# Patient Record
Sex: FEMALE | Race: WHITE
[De-identification: names, ages, dates, MRNs, and addresses within clinical notes are randomized per-mention and may not be internally consistent; named-entity substitution may affect disease eponyms.]

---

## 2017-08-28 ENCOUNTER — HOSPITAL ENCOUNTER (OUTPATIENT)
Dept: HOSPITAL 45 - C.MAMM | Age: 72
Discharge: HOME | End: 2017-08-28
Attending: FAMILY MEDICINE
Payer: COMMERCIAL

## 2017-08-28 DIAGNOSIS — Z12.31: Primary | ICD-10-CM

## 2017-08-28 NOTE — MAMMOGRAPHY REPORT
BILATERAL DIGITAL SCREENING MAMMOGRAM WITH CAD: 8/28/2017

CLINICAL HISTORY: Routine screening.  Patient has no complaints.  





TECHNIQUE: Bilateral CC and MLO views were obtained, with additional attempts at both CC and left MLO
 views to include more posterior tissue.  Current study was also evaluated with a Computer Aided Dete
ction (CAD) system.  



COMPARISON: Comparison is made to exams dated:  7/25/2016 mammogram, 7/22/2015 mammogram, 7/21/2014 m
ammogram, 7/18/2013 mammogram, 7/16/2012 mammogram, and 7/12/2010 mammogram - [facility]
enter.   



BREAST COMPOSITION:  There are scattered areas of fibroglandular density in both breasts.  



FINDINGS: The exam is suboptimal due to inability of the patient adequately position for the exam, pa
rticularly of the left MLO view, despite assistance from a second mammography technologist.  There ar
e a few stable benign-appearing calcifications in the breasts.  No obvious new mass, architectural di
stortion or cluster of suspicious microcalcifications is seen.  



IMPRESSION:  ACR BI-RADS CATEGORY 1: NEGATIVE

There is no mammographic evidence of malignancy, within the limitations of the exam. A 1 year screeni
ng mammogram is recommended.  The patient will receive written notification of the results.  





Approximately 10% of breast cancers are not detected with mammography. A negative mammographic report
 should not delay biopsy if a clinically suggestive mass is present.



Rosa Bermudez M.D.          

ay/:8/28/2017 15:18:29  



Imaging Technologist: Larissa Palumbo RT(R)(M), [facility]

letter sent: Normal 1/2  

BI-RADS Code: ACR BI-RADS Category 1: Negative

## 2018-02-11 ENCOUNTER — HOSPITAL ENCOUNTER (INPATIENT)
Dept: HOSPITAL 45 - C.EDB | Age: 73
LOS: 5 days | Discharge: NURSING, REHAB OR LONG-TERM CARE | DRG: 54 | End: 2018-02-16
Attending: HOSPITALIST | Admitting: FAMILY MEDICINE
Payer: COMMERCIAL

## 2018-02-11 VITALS
BODY MASS INDEX: 24.6 KG/M2 | HEIGHT: 61 IN | BODY MASS INDEX: 24.6 KG/M2 | BODY MASS INDEX: 24.6 KG/M2 | BODY MASS INDEX: 24.6 KG/M2 | HEIGHT: 61 IN | BODY MASS INDEX: 24.6 KG/M2 | WEIGHT: 130.29 LBS | BODY MASS INDEX: 24.6 KG/M2 | BODY MASS INDEX: 24.6 KG/M2 | WEIGHT: 130.29 LBS

## 2018-02-11 VITALS
TEMPERATURE: 98.42 F | DIASTOLIC BLOOD PRESSURE: 91 MMHG | SYSTOLIC BLOOD PRESSURE: 152 MMHG | HEART RATE: 89 BPM | OXYGEN SATURATION: 95 %

## 2018-02-11 DIAGNOSIS — G93.49: ICD-10-CM

## 2018-02-11 DIAGNOSIS — E87.6: ICD-10-CM

## 2018-02-11 DIAGNOSIS — M25.562: ICD-10-CM

## 2018-02-11 DIAGNOSIS — R29.6: ICD-10-CM

## 2018-02-11 DIAGNOSIS — K59.00: ICD-10-CM

## 2018-02-11 DIAGNOSIS — C71.1: Primary | ICD-10-CM

## 2018-02-11 DIAGNOSIS — E78.5: ICD-10-CM

## 2018-02-11 DIAGNOSIS — Z86.73: ICD-10-CM

## 2018-02-11 LAB
BASOPHILS # BLD: 0 K/UL (ref 0–0.2)
BASOPHILS NFR BLD: 0 %
BUN SERPL-MCNC: 15 MG/DL (ref 7–18)
CALCIUM SERPL-MCNC: 9.4 MG/DL (ref 8.5–10.1)
CK MB SERPL-MCNC: 0.6 NG/ML (ref 0.5–3.6)
CO2 SERPL-SCNC: 32 MMOL/L (ref 21–32)
CREAT SERPL-MCNC: 0.78 MG/DL (ref 0.6–1.2)
EOS ABS #: 0.01 K/UL (ref 0–0.5)
EOSINOPHIL NFR BLD AUTO: 145 K/UL (ref 130–400)
GLUCOSE SERPL-MCNC: 138 MG/DL (ref 70–99)
HCT VFR BLD CALC: 37.5 % (ref 37–47)
HGB BLD-MCNC: 12.8 G/DL (ref 12–16)
IG#: 0.01 K/UL (ref 0–0.02)
IMM GRANULOCYTES NFR BLD AUTO: 11.3 %
INR PPP: 0.9 (ref 0.9–1.1)
LYMPHOCYTES # BLD: 0.48 K/UL (ref 1.2–3.4)
MCH RBC QN AUTO: 34.1 PG (ref 25–34)
MCHC RBC AUTO-ENTMCNC: 34.1 G/DL (ref 32–36)
MCV RBC AUTO: 100 FL (ref 80–100)
MONO ABS #: 0.39 K/UL (ref 0.11–0.59)
MONOCYTES NFR BLD: 9.2 %
NEUT ABS #: 3.34 K/UL (ref 1.4–6.5)
NEUTROPHILS # BLD AUTO: 0.2 %
NEUTROPHILS NFR BLD AUTO: 79.1 %
PMV BLD AUTO: 8.7 FL (ref 7.4–10.4)
POTASSIUM SERPL-SCNC: 4 MMOL/L (ref 3.5–5.1)
PTT PATIENT: 23.2 SECONDS (ref 21–31)
RED CELL DISTRIBUTION WIDTH CV: 12.9 % (ref 11.5–14.5)
RED CELL DISTRIBUTION WIDTH SD: 46.9 FL (ref 36.4–46.3)
SODIUM SERPL-SCNC: 142 MMOL/L (ref 136–145)
WBC # BLD AUTO: 4.23 K/UL (ref 4.8–10.8)

## 2018-02-11 RX ADMIN — LATANOPROST SCH DROPS: 50 SOLUTION/ DROPS OPHTHALMIC at 23:09

## 2018-02-11 RX ADMIN — LEVETIRACETAM SCH MLS/HR: 500 INJECTION, SOLUTION, CONCENTRATE INTRAVENOUS at 23:08

## 2018-02-11 NOTE — DIAGNOSTIC IMAGING REPORT
HEAD WITHOUT CONTRAST (CT)



CT DOSE: 724.83 mGy.cm



HISTORY: Mental status change  Pt c/o left leg weakness



TECHNIQUE: Multiaxial CT images of the head were performed without the use of

intravenous contrast.  A dose lowering technique was utilized adhering to the

principles of ALARA.





Comparison: 10/9/2016



Findings: Major sinuses are considered clear. Interval frontal craniotomy.

Craniotomy flap is aligned anatomically.



Mild ventricular prominence. Midline shift and effacement of the right lateral

ventricle is not present currently.



There are findings of progressive frontal atrophy as well as encephalomalacia.

Appears to be a degree of residual vasogenic edema involving the anterior right

frontal as well as anterior left frontal lobes. A component of high density

immediately anterior to the right lateral ventricle is present which was

identified on the prior study. This presumably represents calcification.



No evidence for acute intracranial hemorrhage. The calvarium and skull base are

intact. The ventricles and sulci are within normal limits. There is no mass,

hematoma, midline shift, or acute infarct.



Impression:



1. Interval frontal craniotomy with considerable encephalomalacia involving the

anterior right and left frontal lobe regions..

2. Mildly progressive atrophy compared to the prior study.

3. No evidence for acute intracranial hemorrhage or midline shift.

4. Presence of vasogenic edema of the inferior right frontal lobe raises and a

potential concern for residual disease. Correlation with prior imaging studies

from any additional facility is considered mandatory.











The above report was generated using voice recognition software.  It may contain

grammatical, syntax or spelling errors.







Electronically signed by:  Phoenix Gerard M.D.

2/11/2018 5:08 PM



Dictated Date/Time:  2/11/2018 4:57 PM

## 2018-02-11 NOTE — DIAGNOSTIC IMAGING REPORT
L FEMUR 2 VIEWS ROUTINE



CLINICAL HISTORY: Pt c/o left hip pain  trauma. Pain.



COMPARISON: None.



DISCUSSION: The bones and joint spaces appear intact. There is no evidence of

fracture, dislocation or bony disease. Moderate degenerative change of the left

knee and left hip.



IMPRESSION: No acute process.











The above report was generated using voice recognition software.  It may contain

grammatical, syntax or spelling errors.







Electronically signed by:  Phoenix Gerard M.D.

2/11/2018 5:36 PM



Dictated Date/Time:  2/11/2018 5:36 PM

## 2018-02-11 NOTE — DIAGNOSTIC IMAGING REPORT
PELVIS 1 OR 2 VIEW ROUTINE



CLINICAL HISTORY: Pt c/o left hip pain pain



COMPARISON: None.



DISCUSSION: Mild degenerative changes of the hips bilaterally. Diffuse

calcifications soft tissue pelvis suggesting a fibroid-type uterus. No acute

bony abnormality. There is no evidence for soft tissue swelling.



IMPRESSION: No acute bony abnormality. Calcified uterine fibroids











The above report was generated using voice recognition software.  It may contain

grammatical, syntax or spelling errors.







Electronically signed by:  Phoenix Gerard M.D.

2/11/2018 5:42 PM



Dictated Date/Time:  2/11/2018 5:41 PM

## 2018-02-11 NOTE — DIAGNOSTIC IMAGING REPORT
L LOWER EXTREMITY WITHOUT



CT DOSE: 631.97 mGy.cm



HISTORY: Trauma  left femur to mid tibia



TECHNIQUE: Multiaxial CT images of the left thigh and lower leg were performed

and reformatted in the sagittal and coronal plane without the use of contrast. 

A dose lowering technique was utilized adhering to the principles of ALARA.



COMPARISON:  None.



FINDINGS: No fracture or dislocation. Soft tissues are unremarkable.

Degenerative changes of the left knee and to a lesser extent left hip. Small

left knee joint effusion. This presumably is on a degenerative basis.



IMPRESSION:  

No acute bony abnormality.











The above report was generated using voice recognition software.  It may contain

grammatical, syntax or spelling errors.







Electronically signed by:  Phoenix Gerard M.D.

2/11/2018 6:49 PM



Dictated Date/Time:  2/11/2018 6:46 PM

## 2018-02-11 NOTE — DIAGNOSTIC IMAGING REPORT
BRAIN COMBO



CLINICAL HISTORY: Pt brain tumor brain tumor



COMPARISON STUDY:  No previous studies for comparison. 



TECHNIQUE:  Utilizing a 1.5 Alexandra magnet and dedicated coil, multiplanar,

multiecho imaging of the brain was performed pre and postcontrast

administration.  IV administration of 5.5 mL of Gadavist contrast was

uneventful.



FINDINGS: Limited study in terms of diagnostic accuracy as prior studies are not

performed at this institution and are not available. Patient also exhibits

extensive motion making most sequences near nondiagnostic.



Diffusion-weighted images appear to be negative for a major acute ischemic

insult. The may be a punctate right superior parietal ischemic focus this is

seen on image 19 of 50 this may also represent motion artifact.



Postoperative changes consistent with extensive anterior frontal craniotomy

defect. Extensive post procedural encephalomalacia right frontal lobe. Vasogenic

edema of the mid and inferior right frontal lobe with additional regions of

atrophic change involving the left frontal and convexity regions.



Postcontrast images are essentially nondiagnostic. They do not show a major

component of enhancement although small regions of enhancement are potentially

not identified.



There is no midline shift.



IMPRESSION:  

1. Extremely limited study as no prior postoperative scans are available for

comparison.

2. Current study is also severely compromised due to severe patient motion

despite medication.

3. Extensive right frontal postoperative encephalomalacia with evidence for an

anatomically aligned craniotomy flap.

4. Vasogenic edema primarily of the mid to inferior right frontal and to lesser

extent left inferior frontal lobes of uncertain chronicity.

5. Potential small punctate acute ischemic focus right superior parietal lobe

6. No significant postcontrast enhancement although quality of those studies is

severely compromised due to patient motion and are near nondiagnostic











The above report was generated using voice recognition software.  It may contain

grammatical, syntax or spelling errors.







Electronically signed by:  Phoenix Gerard M.D.

2/11/2018 8:21 PM



Dictated Date/Time:  2/11/2018 8:16 PM

## 2018-02-11 NOTE — EMERGENCY ROOM VISIT NOTE
History


Report prepared by Ethan:  Danny Norton


Under the Supervision of:  Dr. Tyrone Diehl M.D.


First contact with patient:  15:46


Chief Complaint:  FALL


Stated Complaint:  FELL OUT OF BED THIS AM L KNEE PAIN, CHEMO CURRENT





History of Present Illness


The patient is a 73 year old female who presents to the Emergency Room with 

complaints of constant, left-sided weakness following her fall out of bed this 

morning. The patient's daughter notes that the patient has fallen several times 

in the past few days. She also notes that the patient has been experiencing 

left sided weakness and has been unable to bear weight on her left side since 

her fall. The daughter notes that the patient has been more "glossy-eyed" than 

usual, is dehydrated, and has not been eating well. The patient has not had 

anything to eat today. After the fall, the patient's daughter iced the patient'

s left hip, gave the patient her prescribed Norco, and propped her up with 

pillows. The patient has brain cancer and her last scheduled dose of 

chemotherapy is tonight.





   Source of History:  family (Daughter)


   Onset:  Following her fall today.


   Position:  other (Left side. )


   Quality:  other (Weakness. )


   Timing:  constant


Note:


Associated Symptoms: Glossy eyes, loss of appetite, Inability to bear weight on 

left side. 


,





Review of Systems


See HPI for pertinent positives & negatives. A total of 10 systems reviewed and 

were otherwise negative.





Past Medical & Surgical


Medical Problems:


(1) HLD (hyperlipidemia)








Family History





No pertinent family history





Social History


Smoking Status:  Unknown if Ever Smoked


Occupation Status:  retired





Current/Historical Medications


Scheduled


Atorvastatin (Lipitor), 20 MG PO HS


Glucosamine-Chondroitin (Osteo Bi-Flex Regular Str), 1 TAB PO DAILY


Latanoprost (Xalatan 0.005% Oph Sol), 1 DROPS OP HS


Levetiracetam (Keppra), 750 MG PO BID


Lorazepam (Ativan), 0.5 MG PO AS DIRECTED


Multivitamin (Multivitamin), 1 TAB PO DAILY


Polyethylene Glycol 3350 (Miralax), 17 GM PO DAILY


Temozolomide (Temodar), 100 MG PO DAILY


Temozolomide (Temodar), 140 MG PO DAILY





Scheduled PRN


Acetaminophen (Tylenol Arthitis Ext Rel), 1,300 MG PO Q8 PRN for Pain


Hydrocodone/Acetaminophen 5MG/325MG (Norco 5MG/325MG), 1 TABLET PO Q6H PRN for 

Pain


Ondansetron Hcl (Zofran), 8 MG PO BID PRN for Nausea





Allergies


Coded Allergies:  


     No Known Allergies (Unverified , 10/9/16)





Physical Exam


Vital Signs











  Date Time  Temp Pulse Resp B/P (MAP) Pulse Ox O2 Delivery O2 Flow Rate FiO2


 


2/11/18 17:51  76 16 113/70 96 Room Air  


 


2/11/18 16:19  75 16 117/82 95 Room Air  


 


2/11/18 16:17  78      


 


2/11/18 14:45 36.7 87 16 111/72 95 Room Air  











Physical Exam


GENERAL: The patient is cachectic in appearance. Female


HEAD: Multiple contusions to face. 


EYES: Ocular movements intact pupils equal and react to light


OROPHARYNX mucous membranes are moist no exudates present no erythema or edema 

present


NECK: Supple no nuchal rigidity


CHEST: Good equal expansion


LUNGS: Clear and equal to auscultation


CARDIAC: Normal S1 and S2


ABDOMEN: Soft nontender no guarding


BACK: No CVA tenderness


EXTREMITIES: Multiple bruises to her left knee and left hip area. 


NEURO: The patient appears confused. She is unable to answer questions 

appropriately.





Medical Decision & Procedures


ER Provider


Diagnostic Interpretation:


Radiology results as stated below per my review and radiologist interpretation:





CHEST ONE VIEW PORTABLE





CLINICAL HISTORY: Pt c/o multiple falls trauma





COMPARISON STUDY:  10/9/2016





FINDINGS: The bones soft tissues and hemidiaphragms are normal. The


cardiomediastinal silhouette is normal. The lungs are clear. The pulmonary


vasculature is normal. 





IMPRESSION:  Negative chest. 

















The above report was generated using voice recognition software.  It may contain


grammatical, syntax or spelling errors.














Electronically signed by:  Phoenix Gerard M.D.


2/11/2018 5:32 PM





Dictated Date/Time:  2/11/2018 5:32 PM








HEAD WITHOUT CONTRAST (CT)





CT DOSE: 724.83 mGy.cm





HISTORY: Mental status change  Pt c/o left leg weakness





TECHNIQUE: Multiaxial CT images of the head were performed without the use of


intravenous contrast.  A dose lowering technique was utilized adhering to the


principles of ALARA.








Comparison: 10/9/2016





Findings: Major sinuses are considered clear. Interval frontal craniotomy.


Craniotomy flap is aligned anatomically.





Mild ventricular prominence. Midline shift and effacement of the right lateral


ventricle is not present currently.





There are findings of progressive frontal atrophy as well as encephalomalacia.


Appears to be a degree of residual vasogenic edema involving the anterior right


frontal as well as anterior left frontal lobes. A component of high density


immediately anterior to the right lateral ventricle is present which was


identified on the prior study. This presumably represents calcification.





No evidence for acute intracranial hemorrhage. The calvarium and skull base are


intact. The ventricles and sulci are within normal limits. There is no mass,


hematoma, midline shift, or acute infarct.





Impression:





1. Interval frontal craniotomy with considerable encephalomalacia involving the


anterior right and left frontal lobe regions..


2. Mildly progressive atrophy compared to the prior study.


3. No evidence for acute intracranial hemorrhage or midline shift.


4. Presence of vasogenic edema of the inferior right frontal lobe raises and a


potential concern for residual disease. Correlation with prior imaging studies


from any additional facility is considered mandatory.

















The above report was generated using voice recognition software.  It may contain


grammatical, syntax or spelling errors.











Electronically signed by:  Phoenix Gerard M.D.


2/11/2018 5:08 PM





Dictated Date/Time:  2/11/2018 4:57 PM





L FEMUR 2 VIEWS ROUTINE





CLINICAL HISTORY: Pt c/o left hip pain  trauma. Pain.





COMPARISON: None.





DISCUSSION: The bones and joint spaces appear intact. There is no evidence of


fracture, dislocation or bony disease. Moderate degenerative change of the left


knee and left hip.





IMPRESSION: No acute process.

















The above report was generated using voice recognition software.  It may contain


grammatical, syntax or spelling errors.











Electronically signed by:  Phoenix Gerard M.D.


2/11/2018 5:36 PM





Dictated Date/Time:  2/11/2018 5:36 PM





PELVIS 1 OR 2 VIEW ROUTINE





CLINICAL HISTORY: Pt c/o left hip pain pain





COMPARISON: None.





DISCUSSION: Mild degenerative changes of the hips bilaterally. Diffuse


calcifications soft tissue pelvis suggesting a fibroid-type uterus. No acute


bony abnormality. There is no evidence for soft tissue swelling.





IMPRESSION: No acute bony abnormality. Calcified uterine fibroids

















The above report was generated using voice recognition software.  It may contain


grammatical, syntax or spelling errors.











Electronically signed by:  Phoenix Gerard M.D.


2/11/2018 5:42 PM





Dictated Date/Time:  2/11/2018 5:41 PM





L TIBIA/FIBULA 2 VIEWS ROUTINE





CLINICAL HISTORY: Pt c/o left hip pain  trauma. Pain.





COMPARISON: None.





DISCUSSION: The bones and joint spaces appear intact. There is no evidence of


fracture, dislocation or bony disease. There is no evidence for soft tissue


swelling.





IMPRESSION: Negative study.

















The above report was generated using voice recognition software.  It may contain


grammatical, syntax or spelling errors.











Electronically signed by:  Phoenix Gerard M.D.


2/11/2018 5:36 PM





Dictated Date/Time:  2/11/2018 5:35 PM





Laboratory Results


2/11/18 16:15








Red Blood Count 3.75, Mean Corpuscular Volume 100.0, Mean Corpuscular 

Hemoglobin 34.1, Mean Corpuscular Hemoglobin Concent 34.1, Mean Platelet Volume 

8.7, Neutrophils (%) (Auto) 79.1, Lymphocytes (%) (Auto) 11.3, Monocytes (%) (

Auto) 9.2, Eosinophils (%) (Auto) 0.2, Basophils (%) (Auto) 0.0, Neutrophils # (

Auto) 3.34, Lymphocytes # (Auto) 0.48, Monocytes # (Auto) 0.39, Eosinophils # (

Auto) 0.01, Basophils # (Auto) 0.00





2/11/18 16:15

















Test


  2/11/18


16:15 2/11/18


16:28


 


White Blood Count


  4.23 K/uL


(4.8-10.8) 


 


 


Red Blood Count


  3.75 M/uL


(4.2-5.4) 


 


 


Hemoglobin


  12.8 g/dL


(12.0-16.0) 


 


 


Hematocrit 37.5 % (37-47)  


 


Mean Corpuscular Volume


  100.0 fL


() 


 


 


Mean Corpuscular Hemoglobin


  34.1 pg


(25-34) 


 


 


Mean Corpuscular Hemoglobin


Concent 34.1 g/dl


(32-36) 


 


 


Platelet Count


  145 K/uL


(130-400) 


 


 


Mean Platelet Volume


  8.7 fL


(7.4-10.4) 


 


 


Neutrophils (%) (Auto) 79.1 %  


 


Lymphocytes (%) (Auto) 11.3 %  


 


Monocytes (%) (Auto) 9.2 %  


 


Eosinophils (%) (Auto) 0.2 %  


 


Basophils (%) (Auto) 0.0 %  


 


Neutrophils # (Auto)


  3.34 K/uL


(1.4-6.5) 


 


 


Lymphocytes # (Auto)


  0.48 K/uL


(1.2-3.4) 


 


 


Monocytes # (Auto)


  0.39 K/uL


(0.11-0.59) 


 


 


Eosinophils # (Auto)


  0.01 K/uL


(0-0.5) 


 


 


Basophils # (Auto)


  0.00 K/uL


(0-0.2) 


 


 


RDW Standard Deviation


  46.9 fL


(36.4-46.3) 


 


 


RDW Coefficient of Variation


  12.9 %


(11.5-14.5) 


 


 


Immature Granulocyte % (Auto) 0.2 %  


 


Immature Granulocyte # (Auto)


  0.01 K/uL


(0.00-0.02) 


 


 


Prothrombin Time


  9.9 SECONDS


(9.0-12.0) 


 


 


Prothromb Time International


Ratio 0.9 (0.9-1.1) 


  


 


 


Activated Partial


Thromboplast Time 23.2 SECONDS


(21.0-31.0) 


 


 


Partial Thromboplastin Ratio 0.9  


 


Anion Gap


  7.0 mmol/L


(3-11) 


 


 


Est Creatinine Clear Calc


Drug Dose 54.2 ml/min 


  


 


 


Estimated GFR (


American) 88.6 


  


 


 


Estimated GFR (Non-


American 76.5 


  


 


 


BUN/Creatinine Ratio 18.8 (10-20)  


 


Calcium Level


  9.4 mg/dl


(8.5-10.1) 


 


 


Total Creatine Kinase


  73 U/L


() 


 


 


Creatine Kinase MB


  0.6 ng/ml


(0.5-3.6) 


 


 


Creatine Kinase MB Ratio 0.8 (0-3.0)  


 


Troponin I


  < 0.015 ng/ml


(0-0.045) 


 


 


Chemistry Specimen Hemolysis   


 


Urine Color  YELLOW 


 


Urine Appearance  CLEAR (CLEAR) 


 


Urine pH  7.0 (4.5-7.5) 


 


Urine Specific Gravity


  


  1.014


(1.000-1.030)


 


Urine Protein  NEG (NEG) 


 


Urine Glucose (UA)  NEG (NEG) 


 


Urine Ketones  NEG (NEG) 


 


Urine Occult Blood  NEG (NEG) 


 


Urine Nitrite  NEG (NEG) 


 


Urine Bilirubin  NEG (NEG) 


 


Urine Urobilinogen  NEG (NEG) 


 


Urine Leukocyte Esterase  NEG (NEG) 





Labs reviewed by ED physician.





ECG


Rate (beats per minute):  82


Rhythm:  normal sinus


Findings:  other (Pre-Mature atrial complex, No ST elevation or depression, 

Normal Axis)


Change:


Patient's electrocardiogram per my interpretation.





ED Course


1547: Past medical records reviewed. The patient was evaluated in room B03. A 

complete history and physical examination was performed. 





1600: Ordered Hydromorphone HCL .25mg IV. 





1816: I discussed the patient's case with Dr. Elbert Rivas. 

He will evaluate the patient for further care and treatment.





Medical Decision





Differential diagnosis:


Etiologies such as metabolic, infection, hypo/hyperglycemia, electrolyte 

abnormalities, cardiac sources, intracerebral event, toxicologic, neurologic, 

as well as others were entertained.





Medication Reconcilliation


Current Medication List:  was personally reviewed by me





Blood Pressure Screening


Patient's blood pressure:  Normal blood pressure





Consults


Time Called:  1802


Consulting Physician:  Dr. Elbert Rivas


Returned Call:  [phone]


I discussed the patient's case with Dr. Elbert Rivas. He will 

evaluate the patient for further care and treatment.





Impression





 Primary Impression:  


 Multiple falls


 Additional Impressions:  


 Left leg pain


 Confusion





Scribe Attestation


The scribe's documentation has been prepared under my direction and personally 

reviewed by me in its entirety. I confirm that the note above accurately 

reflects all work, treatment, procedures, and medical decision making performed 

by me.





Departure Information


Dispostion


Being Evaluated By Hospitalist





Referrals


Shelley Jackson D.O. (PCP)





Patient Instructions


My [facility]





Problem Qualifiers
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
## 2018-02-11 NOTE — DIAGNOSTIC IMAGING REPORT
L TIBIA/FIBULA 2 VIEWS ROUTINE



CLINICAL HISTORY: Pt c/o left hip pain  trauma. Pain.



COMPARISON: None.



DISCUSSION: The bones and joint spaces appear intact. There is no evidence of

fracture, dislocation or bony disease. There is no evidence for soft tissue

swelling.



IMPRESSION: Negative study.











The above report was generated using voice recognition software.  It may contain

grammatical, syntax or spelling errors.







Electronically signed by:  Phoenix Gerard M.D.

2/11/2018 5:36 PM



Dictated Date/Time:  2/11/2018 5:35 PM

## 2018-02-11 NOTE — DIAGNOSTIC IMAGING REPORT
CHEST ONE VIEW PORTABLE



CLINICAL HISTORY: Pt c/o multiple falls trauma



COMPARISON STUDY:  10/9/2016



FINDINGS: The bones soft tissues and hemidiaphragms are normal. The

cardiomediastinal silhouette is normal. The lungs are clear. The pulmonary

vasculature is normal. 



IMPRESSION:  Negative chest. 











The above report was generated using voice recognition software.  It may contain

grammatical, syntax or spelling errors.









Electronically signed by:  Phoenix Gerard M.D.

2/11/2018 5:32 PM



Dictated Date/Time:  2/11/2018 5:32 PM

## 2018-02-12 VITALS
HEART RATE: 82 BPM | OXYGEN SATURATION: 96 % | SYSTOLIC BLOOD PRESSURE: 109 MMHG | TEMPERATURE: 98.06 F | DIASTOLIC BLOOD PRESSURE: 76 MMHG

## 2018-02-12 VITALS
OXYGEN SATURATION: 96 % | DIASTOLIC BLOOD PRESSURE: 74 MMHG | TEMPERATURE: 97.34 F | SYSTOLIC BLOOD PRESSURE: 136 MMHG | HEART RATE: 68 BPM

## 2018-02-12 VITALS
SYSTOLIC BLOOD PRESSURE: 114 MMHG | TEMPERATURE: 98.6 F | HEART RATE: 87 BPM | OXYGEN SATURATION: 95 % | DIASTOLIC BLOOD PRESSURE: 76 MMHG

## 2018-02-12 VITALS — OXYGEN SATURATION: 96 %

## 2018-02-12 VITALS
OXYGEN SATURATION: 94 % | TEMPERATURE: 98.24 F | DIASTOLIC BLOOD PRESSURE: 71 MMHG | HEART RATE: 73 BPM | SYSTOLIC BLOOD PRESSURE: 108 MMHG

## 2018-02-12 VITALS
DIASTOLIC BLOOD PRESSURE: 81 MMHG | SYSTOLIC BLOOD PRESSURE: 121 MMHG | OXYGEN SATURATION: 97 % | TEMPERATURE: 97.34 F | HEART RATE: 77 BPM

## 2018-02-12 VITALS
DIASTOLIC BLOOD PRESSURE: 67 MMHG | HEART RATE: 69 BPM | OXYGEN SATURATION: 94 % | SYSTOLIC BLOOD PRESSURE: 102 MMHG | TEMPERATURE: 98.6 F

## 2018-02-12 RX ADMIN — LATANOPROST SCH DROPS: 50 SOLUTION/ DROPS OPHTHALMIC at 22:12

## 2018-02-12 RX ADMIN — LEVETIRACETAM SCH MLS/HR: 500 INJECTION, SOLUTION, CONCENTRATE INTRAVENOUS at 20:40

## 2018-02-12 RX ADMIN — ACETAMINOPHEN SCH MG: 500 TABLET, COATED ORAL at 20:40

## 2018-02-12 RX ADMIN — LEVETIRACETAM SCH MLS/HR: 500 INJECTION, SOLUTION, CONCENTRATE INTRAVENOUS at 09:05

## 2018-02-12 RX ADMIN — ATORVASTATIN CALCIUM SCH MG: 20 TABLET, FILM COATED ORAL at 22:10
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
## 2018-02-13 VITALS
HEART RATE: 78 BPM | TEMPERATURE: 98.06 F | SYSTOLIC BLOOD PRESSURE: 95 MMHG | OXYGEN SATURATION: 95 % | DIASTOLIC BLOOD PRESSURE: 63 MMHG

## 2018-02-13 VITALS
HEART RATE: 61 BPM | SYSTOLIC BLOOD PRESSURE: 131 MMHG | OXYGEN SATURATION: 94 % | DIASTOLIC BLOOD PRESSURE: 71 MMHG | TEMPERATURE: 98.06 F

## 2018-02-13 VITALS
DIASTOLIC BLOOD PRESSURE: 77 MMHG | TEMPERATURE: 97.88 F | SYSTOLIC BLOOD PRESSURE: 137 MMHG | HEART RATE: 64 BPM | OXYGEN SATURATION: 97 %

## 2018-02-13 VITALS
DIASTOLIC BLOOD PRESSURE: 78 MMHG | OXYGEN SATURATION: 96 % | SYSTOLIC BLOOD PRESSURE: 118 MMHG | TEMPERATURE: 97.7 F | HEART RATE: 62 BPM

## 2018-02-13 VITALS
HEART RATE: 71 BPM | DIASTOLIC BLOOD PRESSURE: 66 MMHG | SYSTOLIC BLOOD PRESSURE: 98 MMHG | OXYGEN SATURATION: 96 % | TEMPERATURE: 98.06 F

## 2018-02-13 VITALS
SYSTOLIC BLOOD PRESSURE: 134 MMHG | TEMPERATURE: 98.06 F | DIASTOLIC BLOOD PRESSURE: 84 MMHG | HEART RATE: 70 BPM | OXYGEN SATURATION: 95 %

## 2018-02-13 VITALS
SYSTOLIC BLOOD PRESSURE: 122 MMHG | TEMPERATURE: 97.7 F | OXYGEN SATURATION: 95 % | HEART RATE: 79 BPM | DIASTOLIC BLOOD PRESSURE: 76 MMHG

## 2018-02-13 RX ADMIN — LATANOPROST SCH DROPS: 50 SOLUTION/ DROPS OPHTHALMIC at 22:03

## 2018-02-13 RX ADMIN — LEVETIRACETAM SCH MLS/HR: 500 INJECTION, SOLUTION, CONCENTRATE INTRAVENOUS at 09:16

## 2018-02-13 RX ADMIN — ACETAMINOPHEN SCH MG: 500 TABLET, COATED ORAL at 13:33

## 2018-02-13 RX ADMIN — ACETAMINOPHEN SCH MG: 500 TABLET, COATED ORAL at 21:12

## 2018-02-13 RX ADMIN — ATORVASTATIN CALCIUM SCH MG: 20 TABLET, FILM COATED ORAL at 21:13

## 2018-02-13 RX ADMIN — DOCUSATE SODIUM SCH MG: 100 CAPSULE, LIQUID FILLED ORAL at 08:24

## 2018-02-13 RX ADMIN — LEVETIRACETAM SCH MLS/HR: 500 INJECTION, SOLUTION, CONCENTRATE INTRAVENOUS at 21:14

## 2018-02-13 RX ADMIN — ACETAMINOPHEN SCH MG: 500 TABLET, COATED ORAL at 08:24

## 2018-02-13 NOTE — ELECTROENCEPHALOGRAPH REPORT
REQUESTING PHYSICIAN:  Ana Lilia Mcfarland; Pablo Latif MD

 

CLINICAL DIAGNOSIS:  Known oligoglioma right hemisphere, post-resection

radiation therapy with continued episodic confusion and increasing weakness,

left lower extremity.

 

ELECTROCARDIOGRAM DIAGNOSIS:  Focally abnormal EEG with high amplitude delta

activity overlying the right frontal and central regions during wakefulness. 

No clear potentially epileptogenic activity is seen.

 

DESCRIPTION OF TRACING:  This EEG was done as a bedside recording with

simultaneous video analysis of patient movement and behavior.  There is quite

a bit of rotational activity in the head and muscle movements which reflected

on the EEG to some degree with myelogenous artifacts arising episodically and

some head rolling artifacts were seen as well.

 

Between these events; however, there is evidence for what appears to be a

normal background rhythm in the alpha range at least over the left hemisphere

posteriorly which is of up to 9-10 Hz of maximum frequency and 30 microvolts

of maximum amplitude.  This activity is less well-developed in the right

hemisphere and actually may be shifted into the theta frequencies. 

Polymorphic theta activity in the mid frequency ranges are seen in normal

amounts over the left hemisphere as her beta activity in the frontal regions,

but over the right frontal region there is quite a bit of high amplitude at

times somewhat rhythmic delta activity without clear cut associated sharp

waves or clear behavioral abnormalities.  No rhythmic theta activity, poly

polyspike or spike wave bursts or other potentially epileptiform patterns are

seen.  The slow wave activity is relatively continuous, although at times

this amplitude varies and other times is replaced by activity in the lower

theta range of lower voltage.  Again, no behavioral abnormalities or clear

clinical seizure activity is seen.

 

INTERPRETATION:  This EEG reveals evidence for focal abnormalities overlying

the right hemisphere and consistent with a known structural disease in this

area.  Some of the amplitude activity may reflect the presence of the

craniotomy and slow wave activity clearly associates with structural lesion,

but does not correlate with potentially epileptogenic activity seen

clinically or on EEG.  The absence of spike or spike wave discharges;

however, does not exclude the presence of potentially ongoing episodic

seizure activity, and clinical correlation is required.

## 2018-02-13 NOTE — PROGRESS NOTE
Internal Med Progress Note


Date of Service:


Feb 13, 2018.


Provider Documentation:





SUBJECTIVE:





Seen and examined at bedside


Daughter states, patient is intermittently confused


Denies any pain, SOB


Has generalized weakness


No new complaints 





OBJECTIVE:





Vital Signs-as noted below





Physical Exam:


General Appearance:Moderately built and nourished, no apparent distress


Head:  normocephalic, Atraumatic 


Eyes:  normal inspection, EOMI, PERRL


Neck:  supple, Trachea midline


Respiratory/Chest: Normal breath sounds, CTA


Cardiovascular: S1, S2, No murmur


Abdomen/GI:Soft, Non tender, Bowel sounds present


Extremities/Musculoskelatal:normal inspection, no edema 


Neurologic/Psych:grossly no focal neurological deficits, left side 4/5 


Skin:  normal color, warm





Lab data as noted below.


ASSESSMENT & PLAN:


Patient is a 73 yr female with a PMH of anaplastic oligodendroglioma of the 

frontal lobe, with hx. of craniotomy and ongoing chemotherapy, hx. of vasogenic 

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 

falls, generalized weakness.





Left Sided Weakness, Confusion, Falls


History of Frontal Lobe Glioma, s/p Resection 2016, s/p Radiation 2017


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 

the tumor bed appears stable  


No plan for Decadron for now


Appreciate Neurology Input


Keppra increased to 1000mg BID


[place] [facility] [place] Temodar yesterday (5 days every month)


PT/OT 


Needs rehab placement


Needs Keppra levels checked in 2 weeks








Left Knee Edema and Pain


s/p Falls


CT lower ext: no fracture


added Tylenol TID and Tramadol PRN


avoid narcotics as much as possible to avoid delirium


monitor








DVT px


SCDs





Code Status:


Full Code - no extraordinary measures as per daughter, but full code for now; 

daughter is POA








Disposition:


lives at home with daughter Melva who is an OT


PT/OT ordered


Needs Rehab placement


Vital Signs:











  Date Time  Temp Pulse Resp B/P (MAP) Pulse Ox O2 Delivery O2 Flow Rate FiO2


 


2/13/18 16:21 36.7 71 16 98/66 (77) 96 Room Air  


 


2/13/18 14:01      Room Air  


 


2/13/18 11:48 36.5 79 16 122/76 (91) 95   


 


2/13/18 08:00      Room Air  


 


2/13/18 07:36 36.5 62 14 118/78 (91) 96 Room Air  





  62      


 


2/13/18 04:00 36.7 61 20 131/71 (91) 94 Room Air  


 


2/13/18 01:13 36.6 64 20 137/77 (97) 97 Room Air  


 


2/13/18 00:05      Room Air  


 


2/12/18 20:05      Room Air  


 


2/12/18 19:23 36.8 73 20 108/71 (83) 94 Room Air
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
## 2018-02-13 NOTE — PROGRESS NOTE
Medicine Progress Note


Date & Time of Visit:


Feb 13, 2018 at 07:20.


Subjective


seen resting in bed, comfortable


daughter Melva at bedside


states left knee pain is improving


daughter reports patient looks improved- alert, less confusion


denies headache, dizziness


no other symptoms





Objective





Last 8 Hrs








  Date Time  Temp Pulse Resp B/P (MAP) Pulse Ox O2 Delivery O2 Flow Rate FiO2


 


2/13/18 04:00 36.7 61 20 131/71 (91) 94 Room Air  


 


2/13/18 01:13 36.6 64 20 137/77 (97) 97 Room Air  


 


2/13/18 00:05      Room Air  








Physical Exam:


General- oriented x 2, not in distress, speaks in sentences with no effort





Head-  atraumatic





Eyes- PERRL, EOMI, anicteric





ENT- oropharynx clear





Neck- supple, no JVD, no adenopathy, no thyromegaly; carotids +2/2





Lungs- clear breath sounds bilaterally





Heart- regular rhythm; no murmur, normal rate





Abdomen- normal bowel sounds, soft, nontender





Extremities- no pretibial edema, no calf tenderness; peripheral pulses intact


left knee: mild edema, no warmth/tenderness





Neuro- alert, oriented x 2; PERRL, EOMI; no facial palsy; no dysarthria; motor 5

/5 on the right, 4/5 on the left; unable to test sensation





Skin- warm & dry





Assessment & Plan


This is a 73 year old female with a PMH of anaplastic oligodendroglioma of the 

frontal lobe, with hx. of craniotomy and ongoing chemotherapy, hx. of vasogenic 

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 

falls, generalized weakness.





Left Sided Weakness, Confusion, Falls


History of Frontal Lobe Glioma, s/p Resection 2016, s/p Radiation 2017


- Brain MRI:


  (+) vasogenic edema, no previous films for comparison


- Neuro consulted


  Keppra increased


  Neuro discussing with Neuro Oncology in [facility] [place], may need Decadron


- on Temodar


-  PT/OT 


  discharge planning eval placed; patient lives with daughter, who is an OT


  there are multiple falls, but no broken bones noted on radiographs or CT





Left Knee Edema and Pain


- s/p Falls


- CT lower ext: no fracture


- added Tylenol TID and Tramadol PRN


- avoid narcotics as much as possible to avoid delirium


- monitor





DVT ppx


SCDs





FULL CODE - no extraordinary measures as per daughter, but full code for now; 

daughter is POA





DISPOSITION


pending


lives at home with daughter Melva who is an OT


PT/OT ordered


Current Inpatient Medications:





Current Inpatient Medications








 Medications


  (Trade)  Dose


 Ordered  Sig/Alexis


 Route  Start Time


 Stop Time Status Last Admin


Dose Admin


 


 Latanoprost


  (Xalatan Oph


 Soln)  1 drops  HS


 OP  2/11/18 21:00


 3/13/18 20:59  2/12/18 22:12


1 DROPS


 


 Ondansetron HCl


  (Zofran Inj)  4 mg  Q4H  PRN


 IV  2/11/18 20:30


 3/13/18 20:29  2/12/18 17:40


4 MG


 


 Acetaminophen 650


 mg/Empty Bag  65 ml @ 


 260 mls/hr  Q6H  PRN


 IV  2/12/18 10:15


 3/14/18 10:14  2/12/18 10:43


260 MLS/HR


 


 Levetiracetam


 1000 mg/Dextrose  110 ml @ 


 420 mls/hr  Q12


 IV  2/12/18 21:00


 3/13/18 20:59  2/12/18 20:40


420 MLS/HR


 


 Temozolomide


  (Temodar Cap)  200 mg  TODAY@2000


 PO  2/12/18 20:00


 3/14/18 19:59  2/12/18 20:38


200 MG


 


 Temozolomide


  (Temodar Cap)  40 mg  TODAY@2000


 PO  2/12/18 20:00


 3/14/18 19:59  2/12/18 20:38


40 MG


 


 Acetaminophen


  (Tylenol Tab)  500 mg  TID


 PO  2/12/18 20:00


 3/14/18 19:59  2/12/18 20:40


500 MG


 


 Atorvastatin


 Calcium


  (Lipitor Tab)  20 mg  HS


 PO  2/12/18 21:00


 3/14/18 20:59  2/12/18 22:10


20 MG


 


 Docusate Sodium


  (coLACE CAP)  100 mg  DAILY


 PO  2/13/18 08:00


 3/15/18 07:59

## 2018-02-14 VITALS
OXYGEN SATURATION: 97 % | TEMPERATURE: 97.7 F | DIASTOLIC BLOOD PRESSURE: 66 MMHG | SYSTOLIC BLOOD PRESSURE: 102 MMHG | HEART RATE: 80 BPM

## 2018-02-14 VITALS — OXYGEN SATURATION: 96 %

## 2018-02-14 VITALS
OXYGEN SATURATION: 94 % | SYSTOLIC BLOOD PRESSURE: 126 MMHG | DIASTOLIC BLOOD PRESSURE: 71 MMHG | TEMPERATURE: 98.42 F | HEART RATE: 72 BPM

## 2018-02-14 VITALS
TEMPERATURE: 98.42 F | OXYGEN SATURATION: 98 % | DIASTOLIC BLOOD PRESSURE: 73 MMHG | HEART RATE: 87 BPM | SYSTOLIC BLOOD PRESSURE: 117 MMHG

## 2018-02-14 VITALS
HEART RATE: 76 BPM | DIASTOLIC BLOOD PRESSURE: 73 MMHG | TEMPERATURE: 97.7 F | SYSTOLIC BLOOD PRESSURE: 106 MMHG | OXYGEN SATURATION: 95 %

## 2018-02-14 VITALS
OXYGEN SATURATION: 96 % | TEMPERATURE: 98.06 F | HEART RATE: 72 BPM | DIASTOLIC BLOOD PRESSURE: 72 MMHG | SYSTOLIC BLOOD PRESSURE: 109 MMHG

## 2018-02-14 VITALS
OXYGEN SATURATION: 97 % | TEMPERATURE: 98.06 F | SYSTOLIC BLOOD PRESSURE: 133 MMHG | DIASTOLIC BLOOD PRESSURE: 85 MMHG | HEART RATE: 76 BPM

## 2018-02-14 LAB
BUN SERPL-MCNC: 27 MG/DL (ref 7–18)
CALCIUM SERPL-MCNC: 9.1 MG/DL (ref 8.5–10.1)
CO2 SERPL-SCNC: 30 MMOL/L (ref 21–32)
CREAT SERPL-MCNC: 0.69 MG/DL (ref 0.6–1.2)
EOSINOPHIL NFR BLD AUTO: 151 K/UL (ref 130–400)
GLUCOSE SERPL-MCNC: 101 MG/DL (ref 70–99)
HCT VFR BLD CALC: 33.6 % (ref 37–47)
HGB BLD-MCNC: 11.4 G/DL (ref 12–16)
MCH RBC QN AUTO: 33.6 PG (ref 25–34)
MCHC RBC AUTO-ENTMCNC: 33.9 G/DL (ref 32–36)
MCV RBC AUTO: 99.1 FL (ref 80–100)
PMV BLD AUTO: 8.8 FL (ref 7.4–10.4)
POTASSIUM SERPL-SCNC: 3.2 MMOL/L (ref 3.5–5.1)
RED CELL DISTRIBUTION WIDTH CV: 12.7 % (ref 11.5–14.5)
RED CELL DISTRIBUTION WIDTH SD: 45.8 FL (ref 36.4–46.3)
SODIUM SERPL-SCNC: 141 MMOL/L (ref 136–145)
WBC # BLD AUTO: 3.13 K/UL (ref 4.8–10.8)

## 2018-02-14 RX ADMIN — ACETAMINOPHEN SCH MG: 500 TABLET, COATED ORAL at 19:20

## 2018-02-14 RX ADMIN — ACETAMINOPHEN SCH MG: 500 TABLET, COATED ORAL at 09:25

## 2018-02-14 RX ADMIN — LEVETIRACETAM SCH MLS/HR: 500 INJECTION, SOLUTION, CONCENTRATE INTRAVENOUS at 19:57

## 2018-02-14 RX ADMIN — DOCUSATE SODIUM SCH MG: 100 CAPSULE, LIQUID FILLED ORAL at 09:25

## 2018-02-14 RX ADMIN — LEVETIRACETAM SCH MLS/HR: 500 INJECTION, SOLUTION, CONCENTRATE INTRAVENOUS at 09:25

## 2018-02-14 RX ADMIN — ATORVASTATIN CALCIUM SCH MG: 20 TABLET, FILM COATED ORAL at 19:58

## 2018-02-14 RX ADMIN — ACETAMINOPHEN SCH MG: 500 TABLET, COATED ORAL at 14:28

## 2018-02-14 RX ADMIN — LATANOPROST SCH DROPS: 50 SOLUTION/ DROPS OPHTHALMIC at 19:58

## 2018-02-14 NOTE — PROGRESS NOTE
Internal Med Progress Note


Date of Service:


Feb 14, 2018.


Provider Documentation:





SUBJECTIVE:





Seen and examined at bedside


States feeling well


No new complaints


Denies any pain, SOB


Has generalized weakness


Discussed with daughter today 





OBJECTIVE:





Vital Signs-as noted below





Physical Exam:


General Appearance:Moderately built and nourished, no apparent distress


Head:  normocephalic, Atraumatic 


Eyes:  normal inspection, EOMI, PERRL


Neck:  supple, Trachea midline


Respiratory/Chest: Normal breath sounds, CTA


Cardiovascular: S1, S2, No murmur


Abdomen/GI:Soft, Non tender, Bowel sounds present


Extremities/Musculoskelatal:normal inspection, no edema 


Neurologic/Psych:grossly no focal neurological deficits, left side 4/5 


Skin:  normal color, warm





Lab data as noted below.


ASSESSMENT & PLAN:


Patient is a 73 yr female with a PMH of anaplastic oligodendroglioma of the 

frontal lobe, with hx. of craniotomy and ongoing chemotherapy, hx. of vasogenic 

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 

falls, generalized weakness.





Left Sided Weakness, Confusion, Falls


History of Frontal Lobe Glioma, s/p Resection 2016, s/p Radiation 2017


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 

the tumor bed appears stable  


No plan for Decadron for now


Appreciate Neurology Input


Keppra increased to 1000mg BID


[place] [facility] [place] Temodar yesterday (5 days every month)


EEG as below


PT/OT 


Needs rehab placement


Needs Keppra levels checked in 2 weeks








Hypokalemia:


Replace and monitor








Left Knee Edema and Pain


s/p Falls


CT lower ext: no fracture


added Tylenol TID and Tramadol PRN


avoid narcotics as much as possible to avoid delirium


monitor








DVT px


SCDs








Code Status:


Full Code - no extraordinary measures as per daughter, but full code for now; 

daughter is POA








Disposition:


lives at home with daughter Melva who is an OT


PT/OT ordered


Needs Rehab placement


 on board


Plan to discharge when placement available





PROCEDURES:





EEG:


This EEG reveals evidence for focal abnormalities overlying


the right hemisphere and consistent with a known structural disease in this


area.  Some of the amplitude activity may reflect the presence of the


craniotomy and slow wave activity clearly associates with structural lesion,


but does not correlate with potentially epileptogenic activity seen


clinically or on EEG.  The absence of spike or spike wave discharges;


however, does not exclude the presence of potentially ongoing episodic


seizure activity, and clinical correlation is required.


Vital Signs:











  Date Time  Temp Pulse Resp B/P (MAP) Pulse Ox O2 Delivery O2 Flow Rate FiO2


 


2/14/18 16:24      Room Air  


 


2/14/18 14:50 36.5 80 20 102/66 (78) 97 Room Air  


 


2/14/18 11:45 36.5 76 20 106/73 (84) 95 Room Air  


 


2/14/18 08:52     96 Room Air  


 


2/14/18 08:47 36.7 72 20 109/72 (84) 96 Room Air  


 


2/14/18 08:00      Room Air  


 


2/14/18 04:01 36.9 72 20 126/71 (89) 94 Room Air  


 


2/14/18 01:00      Room Air  


 


2/13/18 23:55 36.7 70 18 134/84 (101) 95 Room Air  


 


2/13/18 19:21 36.7 78 20 95/63 (74) 95 Room Air  








Lab Results:





Results Past 24 Hours








Test


  2/14/18


05:21 Range/Units


 


 


White Blood Count 3.13 4.8-10.8  K/uL


 


Red Blood Count 3.39 4.2-5.4  M/uL


 


Hemoglobin 11.4 12.0-16.0  g/dL


 


Hematocrit 33.6 37-47  %


 


Mean Corpuscular Volume 99.1   fL


 


Mean Corpuscular Hemoglobin 33.6 25-34  pg


 


Mean Corpuscular Hemoglobin


Concent 33.9


  32-36  g/dl


 


 


RDW Standard Deviation 45.8 36.4-46.3  fL


 


RDW Coefficient of Variation 12.7 11.5-14.5  %


 


Platelet Count 151 130-400  K/uL


 


Mean Platelet Volume 8.8 7.4-10.4  fL


 


Sodium Level 141 136-145  mmol/L


 


Potassium Level 3.2 3.5-5.1  mmol/L


 


Chloride Level 106   mmol/L


 


Carbon Dioxide Level 30 21-32  mmol/L


 


Anion Gap 5.0 3-11  mmol/L


 


Blood Urea Nitrogen 27 7-18  mg/dl


 


Creatinine


  0.69


  0.60-1.20


mg/dl


 


Est Creatinine Clear Calc


Drug Dose 61.3


   ml/min


 


 


Estimated GFR (


American) 101.5


   


 


 


Estimated GFR (Non-


American 87.6


   


 


 


BUN/Creatinine Ratio 38.6 10-20  


 


Random Glucose 101 70-99  mg/dl


 


Calcium Level 9.1 8.5-10.1  mg/dl
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
## 2018-02-15 VITALS
HEART RATE: 83 BPM | DIASTOLIC BLOOD PRESSURE: 83 MMHG | OXYGEN SATURATION: 97 % | TEMPERATURE: 97.88 F | SYSTOLIC BLOOD PRESSURE: 120 MMHG

## 2018-02-15 VITALS
DIASTOLIC BLOOD PRESSURE: 78 MMHG | TEMPERATURE: 97.7 F | OXYGEN SATURATION: 96 % | SYSTOLIC BLOOD PRESSURE: 118 MMHG | HEART RATE: 76 BPM

## 2018-02-15 VITALS
OXYGEN SATURATION: 98 % | HEART RATE: 66 BPM | TEMPERATURE: 97.88 F | DIASTOLIC BLOOD PRESSURE: 75 MMHG | SYSTOLIC BLOOD PRESSURE: 116 MMHG

## 2018-02-15 VITALS
SYSTOLIC BLOOD PRESSURE: 109 MMHG | OXYGEN SATURATION: 98 % | DIASTOLIC BLOOD PRESSURE: 73 MMHG | HEART RATE: 82 BPM | TEMPERATURE: 98.24 F

## 2018-02-15 VITALS
SYSTOLIC BLOOD PRESSURE: 123 MMHG | DIASTOLIC BLOOD PRESSURE: 79 MMHG | OXYGEN SATURATION: 97 % | TEMPERATURE: 98.24 F | HEART RATE: 70 BPM

## 2018-02-15 VITALS — OXYGEN SATURATION: 98 %

## 2018-02-15 VITALS
HEART RATE: 56 BPM | DIASTOLIC BLOOD PRESSURE: 76 MMHG | OXYGEN SATURATION: 99 % | TEMPERATURE: 97.52 F | SYSTOLIC BLOOD PRESSURE: 129 MMHG

## 2018-02-15 VITALS — OXYGEN SATURATION: 99 %

## 2018-02-15 LAB
BUN SERPL-MCNC: 20 MG/DL (ref 7–18)
CALCIUM SERPL-MCNC: 8.7 MG/DL (ref 8.5–10.1)
CO2 SERPL-SCNC: 28 MMOL/L (ref 21–32)
CREAT SERPL-MCNC: 0.6 MG/DL (ref 0.6–1.2)
GLUCOSE SERPL-MCNC: 100 MG/DL (ref 70–99)
POTASSIUM SERPL-SCNC: 3.4 MMOL/L (ref 3.5–5.1)
SODIUM SERPL-SCNC: 142 MMOL/L (ref 136–145)

## 2018-02-15 RX ADMIN — DOCUSATE SODIUM SCH MG: 100 CAPSULE, LIQUID FILLED ORAL at 08:18

## 2018-02-15 RX ADMIN — POLYETHYLENE GLYCOL 3350 PRN GM: 17 POWDER, FOR SOLUTION ORAL at 18:43

## 2018-02-15 RX ADMIN — POLYETHYLENE GLYCOL 3350 PRN GM: 17 POWDER, FOR SOLUTION ORAL at 14:28

## 2018-02-15 RX ADMIN — LEVETIRACETAM SCH MG: 100 SOLUTION ORAL at 22:31

## 2018-02-15 RX ADMIN — ACETAMINOPHEN SCH MG: 160 LIQUID ORAL at 22:34

## 2018-02-15 RX ADMIN — ATORVASTATIN CALCIUM SCH MG: 20 TABLET, FILM COATED ORAL at 21:00

## 2018-02-15 RX ADMIN — LEVETIRACETAM SCH MLS/HR: 500 INJECTION, SOLUTION, CONCENTRATE INTRAVENOUS at 08:18

## 2018-02-15 RX ADMIN — ACETAMINOPHEN SCH MG: 500 TABLET, COATED ORAL at 08:18

## 2018-02-15 RX ADMIN — ACETAMINOPHEN SCH MG: 160 LIQUID ORAL at 16:30

## 2018-02-15 RX ADMIN — ACETAMINOPHEN SCH MG: 160 LIQUID ORAL at 10:33

## 2018-02-15 RX ADMIN — DOCUSATE SODIUM SCH MG: 100 CAPSULE, LIQUID FILLED ORAL at 10:33

## 2018-02-15 RX ADMIN — LATANOPROST SCH DROPS: 50 SOLUTION/ DROPS OPHTHALMIC at 21:00

## 2018-02-15 NOTE — NEUROLOGY PROGRESS NOTES
Neurology Progress Note


Date of Service


Feb 15, 2018.





Subjective


Christa appears to be improving everyday but is still somewhat confused. She is 

sitting bedside with her daughter and OT washing her face, brushing her teeth. 

Daughter was questioning where steroid should be given at this time. There was 

some conversation with Dr Sapp's office which needed clarified.





Objective











  Date Time  Temp Pulse Resp B/P (MAP) Pulse Ox O2 Delivery O2 Flow Rate FiO2


 


2/15/18 11:39 36.6 83 18 120/83 (95) 97 Room Air  


 


2/15/18 08:30     99 Room Air  


 


2/15/18 07:19 36.4 56 20 129/76 (93) 99 Room Air  


 


2/15/18 04:57 36.6 66 18 116/75 (89) 98 Room Air  


 


2/15/18 00:15      Room Air  


 


2/14/18 23:33 36.7 76 20 133/85 (101) 97 Room Air  


 


2/14/18 19:35 36.9 87 18 117/73 (88) 98 Room Air  


 


2/14/18 19:15      Room Air  


 


2/14/18 16:24      Room Air  











Last 24 Hours








Test


  2/15/18


05:31


 


Sodium Level 142 mmol/L 


 


Potassium Level 3.4 mmol/L 


 


Chloride Level 108 mmol/L 


 


Carbon Dioxide Level 28 mmol/L 


 


Anion Gap 6.0 mmol/L 


 


Blood Urea Nitrogen 20 mg/dl 


 


Creatinine 0.60 mg/dl 


 


Est Creatinine Clear Calc


Drug Dose 70.5 ml/min 


 


 


Estimated GFR (


American) 106.3 


 


 


Estimated GFR (Non-


American 91.7 


 


 


BUN/Creatinine Ratio 33.6 


 


Random Glucose 100 mg/dl 


 


Calcium Level 8.7 mg/dl 


 


Magnesium Level 2.3 mg/dl 








Imaging:


no new imaging


Exam:


no exam for this visit





Current Inpatient Medications








 Medications


  (Trade)  Dose


 Ordered  Sig/Alexis


 Route  Start Time


 Stop Time Status Last Admin


Dose Admin


 


 Latanoprost


  (Xalatan Oph


 Soln)  1 drops  HS


 OP  2/11/18 21:00


 3/13/18 20:59  2/14/18 19:58


1 DROPS


 


 Ondansetron HCl


  (Zofran Inj)  4 mg  Q4H  PRN


 IV  2/11/18 20:30


 3/13/18 20:29  2/12/18 17:40


4 MG


 


 Acetaminophen 650


 mg/Empty Bag  65 ml @ 


 260 mls/hr  Q6H  PRN


 IV  2/12/18 10:15


 3/14/18 10:14  2/12/18 10:43


260 MLS/HR


 


 Atorvastatin


 Calcium


  (Lipitor Tab)  20 mg  HS


 PO  2/12/18 21:00


 3/14/18 20:59  2/14/18 19:58


20 MG


 


 Docusate Sodium


  (coLACE CAP)  100 mg  DAILY


 PO  2/13/18 08:00


 3/15/18 07:59  2/14/18 09:25


100 MG


 


 Calcium Carbonate


  (Tums Chew Tab)  500 mg  Q6H  PRN


 PO  2/13/18 13:15


 3/15/18 13:14  2/13/18 13:30


500 MG


 


 Acetaminophen


  (Tylenol Soln)  500 mg  TID


 PO  2/15/18 10:00


 3/17/18 09:59  2/15/18 10:33


500 MG


 


 Senna/Docusate


 Sodium


  (Senokot S Tab)  2 tab  HS


 PO  2/15/18 21:00


 3/17/18 20:59   


 


 


 Polyethylene


  (Miralax Powder


 Packet)  17 gm  DAILY  PRN


 PO  2/15/18 12:15


 3/17/18 12:14  2/15/18 14:28


17 GM


 


 Levetiracetam


  (Keppra Soln)  1,000 mg  Q12


 PO  2/15/18 21:00


 3/17/18 20:59   


 











Impression


73 year old female with known anaplastic oligodendroglima surgical resection 10/

18/2016, with ongoing left sided weakness and confusion





Plan


1. MRI brain combo -vaso edema and left sided resection site- appear to be 

radiation necrosis -compared to previous imaging


2. pushed MRI into [facility] PACS for review by Dr Sapp who is following her 

with serial MRIs for comparison


3. PT/OT - discharge needs may need rehab prior to return home


4. Decadron may be used awaiting comments from Dr Sapp- no decadron at this 

time


5. notes reviewed from [facility] hospital and clinic visits documented left sided 

weakness 


6. EEG no seizure focus


7. increased Keppra 1000 mg BID- will need a level in 2 weeks - she is 

tolerating at this point would watch for irritability with increase


8. gave last dose of Temozolomide 240 mg done yesterday 


will see as needed in our clinic 


follow up already scheduled with Dr Sapp in [place] 


will sign off for now call if questions concerns. 





Conversation today with Dr Sapp at [place]. She advise her viewing and 

comparing of the MRI previously done by [facility] and the MRI done at [facility] which was 

pushed through to [employer] PACS system were very similar and did show some 

edema which was there on the [facility] imaging. She would only give steroids to 

Christa if she was deteriorating at this time. She was concerned with the side 

effects of the steroids including psychosis that would compound the current 

confusion if there was not a clear indication to give them. IF it is given in 

the future with her decline she would give only a Medrol dose pack type taper. 

I have relayed this information to Christa's daughter and the hospitalist 

attending Dr Enmanuel Figueroa.  








I have seen and discussed above patient with Dr Pablo Latif, neurology





Above reviewed and discussed with Ana Lilia Latif MD

## 2018-02-15 NOTE — PROGRESS NOTE
Internal Med Progress Note


Date of Service:


Feb 15, 2018.


Provider Documentation:





SUBJECTIVE:





Seen and examined at bedside


Reports constipation


No other complaints


Denies any chest pain, SOB


Has generalized weakness


Daughter at bedside 





OBJECTIVE:





Vital Signs-as noted below





Physical Exam:


General Appearance:Moderately built and nourished, no apparent distress


Head:  normocephalic, Atraumatic 


Eyes:  normal inspection, EOMI, PERRL


Neck:  supple, Trachea midline


Respiratory/Chest: Normal breath sounds, CTA


Cardiovascular: S1, S2, No murmur


Abdomen/GI:Soft, Non tender, Bowel sounds present


Extremities/Musculoskelatal:normal inspection, no edema 


Neurologic/Psych:grossly no focal neurological deficits, left side 4/5 


Skin:  normal color, warm





Lab data as noted below.


ASSESSMENT & PLAN:


Patient is a 73 yr female with a PMH of anaplastic oligodendroglioma of the 

frontal lobe, with hx. of craniotomy and ongoing chemotherapy, hx. of vasogenic 

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 

falls, generalized weakness.





Left Sided Weakness, Confusion, Falls


History of Frontal Lobe Glioma, s/p Resection 2016, s/p Radiation 2017


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 

the tumor bed appears stable  


Appreciate Neurology Input


Continue Keppra increased to 1000mg BID


Neuro discussing with Neuro Oncology in [facility] 


Completed Temodar yesterday (5 days every month)


 recommended Decadron per patient's daughter. Will discuss with 

Neurology and start accordingly  


EEG as below


PT/OT 


Needs rehab placement


Needs Keppra levels checked in 2 weeks








Hypokalemia:


Replace and monitor








Constipation:


Started bowel regimen








Left Knee Edema and Pain


s/p Falls


CT lower ext: no fracture


added Tylenol TID and Tramadol PRN


avoid narcotics as much as possible to avoid delirium


monitor








DVT px


SCDs








Code Status:


Full Code - no extraordinary measures as per daughter, but full code for now; 

daughter is POA








Disposition:


lives at home with daughter Melva who is an OT


PT/OT ordered


Needs Rehab placement


 on board


Plan to discharge when placement available





PROCEDURES:





EEG:


This EEG reveals evidence for focal abnormalities overlying


the right hemisphere and consistent with a known structural disease in this


area.  Some of the amplitude activity may reflect the presence of the


craniotomy and slow wave activity clearly associates with structural lesion,


but does not correlate with potentially epileptogenic activity seen


clinically or on EEG.  The absence of spike or spike wave discharges;


however, does not exclude the presence of potentially ongoing episodic


seizure activity, and clinical correlation is required.


Vital Signs:











  Date Time  Temp Pulse Resp B/P (MAP) Pulse Ox O2 Delivery O2 Flow Rate FiO2


 


2/15/18 11:39 36.6 83 18 120/83 (95) 97 Room Air  


 


2/15/18 08:30     99 Room Air  


 


2/15/18 07:19 36.4 56 20 129/76 (93) 99 Room Air  


 


2/15/18 04:57 36.6 66 18 116/75 (89) 98 Room Air  


 


2/15/18 00:15      Room Air  


 


2/14/18 23:33 36.7 76 20 133/85 (101) 97 Room Air  


 


2/14/18 19:35 36.9 87 18 117/73 (88) 98 Room Air  


 


2/14/18 19:15      Room Air  


 


2/14/18 16:24      Room Air  


 


2/14/18 14:50 36.5 80 20 102/66 (78) 97 Room Air  








Lab Results:





Results Past 24 Hours








Test


  2/15/18


05:31 Range/Units


 


 


Sodium Level 142 136-145  mmol/L


 


Potassium Level 3.4 3.5-5.1  mmol/L


 


Chloride Level 108   mmol/L


 


Carbon Dioxide Level 28 21-32  mmol/L


 


Anion Gap 6.0 3-11  mmol/L


 


Blood Urea Nitrogen 20 7-18  mg/dl


 


Creatinine


  0.60


  0.60-1.20


mg/dl


 


Est Creatinine Clear Calc


Drug Dose 70.5


   ml/min


 


 


Estimated GFR (


American) 106.3


   


 


 


Estimated GFR (Non-


American 91.7


   


 


 


BUN/Creatinine Ratio 33.6 10-20  


 


Random Glucose 100 70-99  mg/dl


 


Calcium Level 8.7 8.5-10.1  mg/dl


 


Magnesium Level 2.3 1.8-2.4  mg/dl

## 2018-02-16 VITALS
HEART RATE: 70 BPM | SYSTOLIC BLOOD PRESSURE: 104 MMHG | TEMPERATURE: 97.7 F | OXYGEN SATURATION: 97 % | DIASTOLIC BLOOD PRESSURE: 66 MMHG

## 2018-02-16 VITALS
TEMPERATURE: 97.88 F | DIASTOLIC BLOOD PRESSURE: 85 MMHG | HEART RATE: 75 BPM | SYSTOLIC BLOOD PRESSURE: 129 MMHG | OXYGEN SATURATION: 97 %

## 2018-02-16 VITALS
HEART RATE: 75 BPM | TEMPERATURE: 97.88 F | DIASTOLIC BLOOD PRESSURE: 85 MMHG | OXYGEN SATURATION: 97 % | SYSTOLIC BLOOD PRESSURE: 129 MMHG

## 2018-02-16 VITALS
OXYGEN SATURATION: 97 % | HEART RATE: 70 BPM | TEMPERATURE: 97.7 F | DIASTOLIC BLOOD PRESSURE: 78 MMHG | SYSTOLIC BLOOD PRESSURE: 117 MMHG

## 2018-02-16 VITALS — OXYGEN SATURATION: 97 %

## 2018-02-16 VITALS
HEART RATE: 68 BPM | DIASTOLIC BLOOD PRESSURE: 75 MMHG | SYSTOLIC BLOOD PRESSURE: 116 MMHG | TEMPERATURE: 97.52 F | OXYGEN SATURATION: 97 %

## 2018-02-16 VITALS — OXYGEN SATURATION: 99 %

## 2018-02-16 VITALS — OXYGEN SATURATION: 98 %

## 2018-02-16 LAB
BUN SERPL-MCNC: 24 MG/DL (ref 7–18)
CALCIUM SERPL-MCNC: 8.7 MG/DL (ref 8.5–10.1)
CO2 SERPL-SCNC: 27 MMOL/L (ref 21–32)
CREAT SERPL-MCNC: 0.67 MG/DL (ref 0.6–1.2)
EOSINOPHIL NFR BLD AUTO: 148 K/UL (ref 130–400)
GLUCOSE SERPL-MCNC: 100 MG/DL (ref 70–99)
HCT VFR BLD CALC: 34.2 % (ref 37–47)
HGB BLD-MCNC: 11.3 G/DL (ref 12–16)
MCH RBC QN AUTO: 32.8 PG (ref 25–34)
MCHC RBC AUTO-ENTMCNC: 33 G/DL (ref 32–36)
MCV RBC AUTO: 99.4 FL (ref 80–100)
PMV BLD AUTO: 8.9 FL (ref 7.4–10.4)
POTASSIUM SERPL-SCNC: 3.7 MMOL/L (ref 3.5–5.1)
RED CELL DISTRIBUTION WIDTH CV: 12.9 % (ref 11.5–14.5)
RED CELL DISTRIBUTION WIDTH SD: 46.6 FL (ref 36.4–46.3)
SODIUM SERPL-SCNC: 142 MMOL/L (ref 136–145)
WBC # BLD AUTO: 2.45 K/UL (ref 4.8–10.8)

## 2018-02-16 RX ADMIN — ACETAMINOPHEN SCH MG: 160 LIQUID ORAL at 14:28

## 2018-02-16 RX ADMIN — LEVETIRACETAM SCH MG: 100 SOLUTION ORAL at 08:40

## 2018-02-16 RX ADMIN — ACETAMINOPHEN SCH MG: 160 LIQUID ORAL at 08:40

## 2018-02-16 NOTE — PROGRESS NOTE
Internal Med Progress Note


Date of Service:


Feb 16, 2018.


Provider Documentation:





SUBJECTIVE:





Seen and examined at bedside


Doing well


Constipation resolved


Denies any chest pain, SOB


Daughter at bedside 


Planned to be discharged to  today





OBJECTIVE:





Vital Signs-as noted below





Physical Exam:


General Appearance:Moderately built and nourished, no apparent distress


Head:  normocephalic, Atraumatic 


Eyes:  normal inspection, EOMI, PERRL


Neck:  supple, Trachea midline


Respiratory/Chest: Normal breath sounds, CTA


Cardiovascular: S1, S2, No murmur


Abdomen/GI:Soft, Non tender, Bowel sounds present


Extremities/Musculoskelatal:normal inspection, no edema 


Neurologic/Psych:grossly no focal neurological deficits, left side 4/5 


Skin:  normal color, warm





Lab data as noted below.


ASSESSMENT & PLAN:


Patient is a 73 yr female with a PMH of anaplastic oligodendroglioma of the 

frontal lobe, with hx. of craniotomy and ongoing chemotherapy, hx. of vasogenic 

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 

falls, generalized weakness.





Left Sided Weakness, Confusion, Falls


History of Frontal Lobe Glioma, s/p Resection 2016, s/p Radiation 2017


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 

the tumor bed appears stable  


Appreciate Neurology Input


Continue Keppra increased to 1000mg BID


Neuro discussing with Neuro Oncology in [facility] 


Completed Temodar yesterday (5 days every month)


No indication for Decadron per  unless patient deteriorates  


EEG as below


PT/OT 


Needs rehab placement


Needs Keppra levels checked in 2 weeks








Hypokalemia:


Resolved


monitor








Constipation:


Resolved


Continue bowel regimen








Left Knee Edema and Pain


s/p Falls


CT lower ext: no fracture


added Tylenol TID and Tramadol PRN


avoid narcotics as much as possible to avoid delirium


monitor








DVT px


SCDs








Code Status:


Full Code - no extraordinary measures as per daughter, but full code for now; 

daughter is POA








Disposition:


Plan to discharge to [facility] today





Follow up with your PCP  in 1 week after being discharged from [facility]


Follow up with your Neurologist  in 2 weeks as scheduled





Get Keppra levels checked in 2 weeks and follow up with your Neurologist with 

results


Your Keppra is changed to 1000mg Twice a day


Seek immediate medical attention if your symptoms reoccur or worsen





 








PROCEDURES:





EEG:


This EEG reveals evidence for focal abnormalities overlying


the right hemisphere and consistent with a known structural disease in this


area.  Some of the amplitude activity may reflect the presence of the


craniotomy and slow wave activity clearly associates with structural lesion,


but does not correlate with potentially epileptogenic activity seen


clinically or on EEG.  The absence of spike or spike wave discharges;


however, does not exclude the presence of potentially ongoing episodic


seizure activity, and clinical correlation is required.


Vital Signs:











  Date Time  Temp Pulse Resp B/P (MAP) Pulse Ox O2 Delivery O2 Flow Rate FiO2


 


2/16/18 14:58 36.6 75 20 129/85 (100) 97   


 


2/16/18 11:46 36.5 70 13 117/78 (91) 97 Room Air  


 


2/16/18 08:05     97 Room Air  


 


2/16/18 08:00     99 Room Air  


 


2/16/18 07:49 36.5 70 15 104/66 (79) 97 Room Air  


 


2/16/18 03:44 36.4 68 18 116/75 (89) 97 Room Air  


 


2/16/18 00:00     98 Room Air  


 


2/15/18 23:36 36.8 70 18 123/79 (94) 97 Room Air  


 


2/15/18 20:00     98 Room Air  


 


2/15/18 19:39 36.8 82 18 109/73 (85) 98 Room Air  


 


2/15/18 15:55 36.5 76 18 118/78 (91) 96 Room Air  


 


2/15/18 15:51      Room Air  








Lab Results:





Results Past 24 Hours








Test


  2/16/18


05:10 Range/Units


 


 


White Blood Count 2.45 4.8-10.8  K/uL


 


Red Blood Count 3.44 4.2-5.4  M/uL


 


Hemoglobin 11.3 12.0-16.0  g/dL


 


Hematocrit 34.2 37-47  %


 


Mean Corpuscular Volume 99.4   fL


 


Mean Corpuscular Hemoglobin 32.8 25-34  pg


 


Mean Corpuscular Hemoglobin


Concent 33.0


  32-36  g/dl


 


 


RDW Standard Deviation 46.6 36.4-46.3  fL


 


RDW Coefficient of Variation 12.9 11.5-14.5  %


 


Platelet Count 148 130-400  K/uL


 


Mean Platelet Volume 8.9 7.4-10.4  fL


 


Sodium Level 142 136-145  mmol/L


 


Potassium Level 3.7 3.5-5.1  mmol/L


 


Chloride Level 109   mmol/L


 


Carbon Dioxide Level 27 21-32  mmol/L


 


Anion Gap 6.0 3-11  mmol/L


 


Blood Urea Nitrogen 24 7-18  mg/dl


 


Creatinine


  0.67


  0.60-1.20


mg/dl


 


Est Creatinine Clear Calc


Drug Dose 63.9


   ml/min


 


 


Estimated GFR (


American) 102.5


   


 


 


Estimated GFR (Non-


American 88.4


   


 


 


BUN/Creatinine Ratio 35.9 10-20  


 


Random Glucose 100 70-99  mg/dl


 


Calcium Level 8.7 8.5-10.1  mg/dl

## 2018-02-16 NOTE — DISCHARGE INSTRUCTIONS
Discharge Instructions


Date of Service


Feb 16, 2018.





Admission


Reason for Admission:  Astrocytic Glioma; Declining Functional Status





Discharge


Discharge Diagnosis / Problem:  anaplastic oligodendroglima





Discharge Goals


Goal(s):  Decrease discomfort, Improve function





Activity Recommendations


Activity Limitations:  resume your previous activity


Exercise/Sports Limitations:  as tolerated





.





Instructions / Follow-Up


Instructions / Follow-Up


Follow up with your PCP  in 1 week after being discharged from [facility]


Follow up with your Neurologist  in 2 weeks as scheduled





Get Keppra levels checked in 2 weeks and follow up with your Neurologist with 

results


Your Keppra is changed to 1000mg Twice a day


Seek immediate medical attention if your symptoms reoccur or worsen





Current Hospital Diet


Patient's current hospital diet: Regular Diet





Discharge Diet


Recommended Diet:  Regular Diet





Pending Studies


Studies pending at discharge:  no





Medical Emergencies








.


Who to Call and When:





Medical Emergencies:  If at any time you feel your situation is an emergency, 

please call 911 immediately.





.





Non-Emergent Contact


Non-Emergency issues call your:  Primary Care Provider, Neurologist


Call Non-Emergent contact if:  you have a fever, your pain is not controlled, 

your pain is worsening, your pain is unusual for you, your pain is concerning 

you, you have any medication questions


Seek immediate medical attention if your symptoms reoccur or worsen


.


.








"Provider Documentation" section prepared by Enmanuel Figueroa.








.





VTE Core Measure


Inpt VTE Proph given/why not?:  SCD's
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
